# Patient Record
Sex: FEMALE | Race: WHITE | NOT HISPANIC OR LATINO | Employment: FULL TIME | URBAN - NONMETROPOLITAN AREA
[De-identification: names, ages, dates, MRNs, and addresses within clinical notes are randomized per-mention and may not be internally consistent; named-entity substitution may affect disease eponyms.]

---

## 2017-01-05 ENCOUNTER — ANESTHESIA EVENT (OUTPATIENT)
Dept: PERIOP | Facility: HOSPITAL | Age: 42
End: 2017-01-05
Payer: COMMERCIAL

## 2017-01-06 ENCOUNTER — GENERIC CONVERSION - ENCOUNTER (OUTPATIENT)
Dept: OTHER | Facility: OTHER | Age: 42
End: 2017-01-06

## 2017-01-06 ENCOUNTER — ANESTHESIA (OUTPATIENT)
Dept: PERIOP | Facility: HOSPITAL | Age: 42
End: 2017-01-06
Payer: COMMERCIAL

## 2017-01-06 ENCOUNTER — HOSPITAL ENCOUNTER (OUTPATIENT)
Facility: HOSPITAL | Age: 42
Setting detail: OUTPATIENT SURGERY
Discharge: HOME | End: 2017-01-06
Attending: INTERNAL MEDICINE | Admitting: INTERNAL MEDICINE
Payer: COMMERCIAL

## 2017-01-06 VITALS
SYSTOLIC BLOOD PRESSURE: 105 MMHG | TEMPERATURE: 99 F | WEIGHT: 135 LBS | HEIGHT: 63 IN | HEART RATE: 65 BPM | OXYGEN SATURATION: 97 % | DIASTOLIC BLOOD PRESSURE: 67 MMHG | BODY MASS INDEX: 23.92 KG/M2 | RESPIRATION RATE: 18 BRPM

## 2017-01-06 DIAGNOSIS — R10.13 EPIGASTRIC PAIN: ICD-10-CM

## 2017-01-06 LAB — EXT PREGNANCY TEST URINE: NEGATIVE

## 2017-01-06 PROCEDURE — 81025 URINE PREGNANCY TEST: CPT | Performed by: NURSE ANESTHETIST, CERTIFIED REGISTERED

## 2017-01-06 PROCEDURE — 88312 SPECIAL STAINS GROUP 1: CPT | Performed by: INTERNAL MEDICINE

## 2017-01-06 PROCEDURE — 88305 TISSUE EXAM BY PATHOLOGIST: CPT | Performed by: INTERNAL MEDICINE

## 2017-01-06 RX ORDER — MIDAZOLAM HYDROCHLORIDE 1 MG/ML
INJECTION INTRAMUSCULAR; INTRAVENOUS AS NEEDED
Status: DISCONTINUED | OUTPATIENT
Start: 2017-01-06 | End: 2017-01-06 | Stop reason: SURG

## 2017-01-06 RX ORDER — SODIUM CHLORIDE, SODIUM LACTATE, POTASSIUM CHLORIDE, CALCIUM CHLORIDE 600; 310; 30; 20 MG/100ML; MG/100ML; MG/100ML; MG/100ML
125 INJECTION, SOLUTION INTRAVENOUS CONTINUOUS
Status: DISCONTINUED | OUTPATIENT
Start: 2017-01-06 | End: 2017-01-06 | Stop reason: HOSPADM

## 2017-01-06 RX ORDER — OMEPRAZOLE 40 MG/1
40 CAPSULE, DELAYED RELEASE ORAL DAILY
COMMUNITY
End: 2019-05-18 | Stop reason: ALTCHOICE

## 2017-01-06 RX ORDER — FENTANYL CITRATE 50 UG/ML
INJECTION, SOLUTION INTRAMUSCULAR; INTRAVENOUS AS NEEDED
Status: DISCONTINUED | OUTPATIENT
Start: 2017-01-06 | End: 2017-01-06 | Stop reason: SURG

## 2017-01-06 RX ORDER — PROPOFOL 10 MG/ML
INJECTION, EMULSION INTRAVENOUS AS NEEDED
Status: DISCONTINUED | OUTPATIENT
Start: 2017-01-06 | End: 2017-01-06 | Stop reason: SURG

## 2017-01-06 RX ADMIN — FENTANYL CITRATE 50 MCG: 50 INJECTION, SOLUTION INTRAMUSCULAR; INTRAVENOUS at 08:10

## 2017-01-06 RX ADMIN — SODIUM CHLORIDE, SODIUM LACTATE, POTASSIUM CHLORIDE, AND CALCIUM CHLORIDE 125 ML/HR: .6; .31; .03; .02 INJECTION, SOLUTION INTRAVENOUS at 07:16

## 2017-01-06 RX ADMIN — PROPOFOL 20 MG: 10 INJECTION, EMULSION INTRAVENOUS at 08:12

## 2017-01-06 RX ADMIN — MIDAZOLAM HYDROCHLORIDE 2 MG: 1 INJECTION, SOLUTION INTRAMUSCULAR; INTRAVENOUS at 08:08

## 2017-01-06 RX ADMIN — FENTANYL CITRATE 50 MCG: 50 INJECTION, SOLUTION INTRAMUSCULAR; INTRAVENOUS at 08:08

## 2017-01-06 RX ADMIN — PROPOFOL 100 MG: 10 INJECTION, EMULSION INTRAVENOUS at 08:09

## 2017-01-11 ENCOUNTER — GENERIC CONVERSION - ENCOUNTER (OUTPATIENT)
Dept: OTHER | Facility: OTHER | Age: 42
End: 2017-01-11

## 2017-01-24 ENCOUNTER — GENERIC CONVERSION - ENCOUNTER (OUTPATIENT)
Dept: OTHER | Facility: OTHER | Age: 42
End: 2017-01-24

## 2017-01-24 ENCOUNTER — TRANSCRIBE ORDERS (OUTPATIENT)
Dept: ADMINISTRATIVE | Facility: HOSPITAL | Age: 42
End: 2017-01-24

## 2017-01-24 DIAGNOSIS — R10.13 EPIGASTRIC PAIN: ICD-10-CM

## 2017-01-24 DIAGNOSIS — K80.50 CALCULUS OF BILE DUCT WITHOUT CHOLANGITIS OR CHOLECYSTITIS WITHOUT OBSTRUCTION: ICD-10-CM

## 2017-01-24 DIAGNOSIS — K80.50 CALCULUS OF BILE DUCT WITHOUT MENTION OF CHOLECYSTITIS OR OBSTRUCTION: Primary | ICD-10-CM

## 2017-01-25 ENCOUNTER — HOSPITAL ENCOUNTER (OUTPATIENT)
Dept: ULTRASOUND IMAGING | Facility: HOSPITAL | Age: 42
Discharge: HOME | End: 2017-01-25
Attending: INTERNAL MEDICINE
Payer: COMMERCIAL

## 2017-01-25 DIAGNOSIS — R10.13 EPIGASTRIC PAIN: ICD-10-CM

## 2017-01-25 PROCEDURE — 76705 ECHO EXAM OF ABDOMEN: CPT

## 2017-01-26 ENCOUNTER — GENERIC CONVERSION - ENCOUNTER (OUTPATIENT)
Dept: OTHER | Facility: OTHER | Age: 42
End: 2017-01-26

## 2017-01-30 ENCOUNTER — TRANSCRIBE ORDERS (OUTPATIENT)
Dept: ADMINISTRATIVE | Facility: HOSPITAL | Age: 42
End: 2017-01-30

## 2017-01-30 ENCOUNTER — APPOINTMENT (OUTPATIENT)
Dept: LAB | Facility: HOSPITAL | Age: 42
End: 2017-01-30
Attending: INTERNAL MEDICINE
Payer: COMMERCIAL

## 2017-01-30 ENCOUNTER — GENERIC CONVERSION - ENCOUNTER (OUTPATIENT)
Dept: OTHER | Facility: OTHER | Age: 42
End: 2017-01-30

## 2017-01-30 DIAGNOSIS — R10.13 EPIGASTRIC PAIN: ICD-10-CM

## 2017-01-30 LAB
ALBUMIN SERPL BCP-MCNC: 4 G/DL (ref 3.5–5)
ALP SERPL-CCNC: 60 U/L (ref 46–116)
ALT SERPL W P-5'-P-CCNC: 32 U/L (ref 12–78)
AST SERPL W P-5'-P-CCNC: 23 U/L (ref 5–45)
BILIRUB DIRECT SERPL-MCNC: 0.24 MG/DL (ref 0–0.2)
BILIRUB SERPL-MCNC: 1.5 MG/DL (ref 0.2–1)
PROT SERPL-MCNC: 7.1 G/DL (ref 6.4–8.2)

## 2017-01-30 PROCEDURE — 36415 COLL VENOUS BLD VENIPUNCTURE: CPT

## 2017-01-30 PROCEDURE — 80076 HEPATIC FUNCTION PANEL: CPT

## 2017-02-02 ENCOUNTER — ALLSCRIPTS OFFICE VISIT (OUTPATIENT)
Dept: OTHER | Facility: OTHER | Age: 42
End: 2017-02-02

## 2017-02-02 ENCOUNTER — HOSPITAL ENCOUNTER (OUTPATIENT)
Dept: MRI IMAGING | Facility: HOSPITAL | Age: 42
Discharge: HOME | End: 2017-02-02
Attending: INTERNAL MEDICINE
Payer: COMMERCIAL

## 2017-02-02 DIAGNOSIS — K80.50 CALCULUS OF BILE DUCT WITHOUT MENTION OF CHOLECYSTITIS OR OBSTRUCTION: ICD-10-CM

## 2017-02-02 PROCEDURE — 76376 3D RENDER W/INTRP POSTPROCES: CPT

## 2017-02-02 PROCEDURE — 74181 MRI ABDOMEN W/O CONTRAST: CPT

## 2017-02-03 ENCOUNTER — ALLSCRIPTS OFFICE VISIT (OUTPATIENT)
Dept: OTHER | Facility: OTHER | Age: 42
End: 2017-02-03

## 2017-02-09 RX ORDER — MECLIZINE HYDROCHLORIDE 50 MG/1
12.5 TABLET ORAL 3 TIMES DAILY PRN
COMMUNITY
End: 2018-11-02 | Stop reason: ALTCHOICE

## 2017-02-09 RX ORDER — OXYCODONE HYDROCHLORIDE AND ACETAMINOPHEN 5; 325 MG/1; MG/1
1 TABLET ORAL EVERY 4 HOURS PRN
COMMUNITY
End: 2017-02-14 | Stop reason: HOSPADM

## 2017-02-13 ENCOUNTER — ANESTHESIA EVENT (OUTPATIENT)
Dept: PERIOP | Facility: HOSPITAL | Age: 42
End: 2017-02-13
Payer: COMMERCIAL

## 2017-02-14 ENCOUNTER — ANESTHESIA (OUTPATIENT)
Dept: PERIOP | Facility: HOSPITAL | Age: 42
End: 2017-02-14
Payer: COMMERCIAL

## 2017-02-14 ENCOUNTER — HOSPITAL ENCOUNTER (OUTPATIENT)
Facility: HOSPITAL | Age: 42
Setting detail: OUTPATIENT SURGERY
Discharge: HOME | End: 2017-02-14
Attending: SURGERY | Admitting: SURGERY
Payer: COMMERCIAL

## 2017-02-14 VITALS
TEMPERATURE: 100 F | HEART RATE: 67 BPM | SYSTOLIC BLOOD PRESSURE: 122 MMHG | RESPIRATION RATE: 18 BRPM | WEIGHT: 135 LBS | OXYGEN SATURATION: 100 % | HEIGHT: 63 IN | BODY MASS INDEX: 23.92 KG/M2 | DIASTOLIC BLOOD PRESSURE: 77 MMHG

## 2017-02-14 DIAGNOSIS — K80.50 CALCULUS OF BILE DUCT WITHOUT CHOLANGITIS OR CHOLECYSTITIS WITHOUT OBSTRUCTION: ICD-10-CM

## 2017-02-14 LAB — EXT PREGNANCY TEST URINE: NEGATIVE

## 2017-02-14 PROCEDURE — 81025 URINE PREGNANCY TEST: CPT | Performed by: NURSE ANESTHETIST, CERTIFIED REGISTERED

## 2017-02-14 PROCEDURE — 88304 TISSUE EXAM BY PATHOLOGIST: CPT | Performed by: SURGERY

## 2017-02-14 RX ORDER — BUPIVACAINE HYDROCHLORIDE 5 MG/ML
INJECTION, SOLUTION PERINEURAL AS NEEDED
Status: DISCONTINUED | OUTPATIENT
Start: 2017-02-14 | End: 2017-02-14 | Stop reason: HOSPADM

## 2017-02-14 RX ORDER — ONDANSETRON 2 MG/ML
4 INJECTION INTRAMUSCULAR; INTRAVENOUS ONCE
Status: DISCONTINUED | OUTPATIENT
Start: 2017-02-14 | End: 2017-02-14 | Stop reason: HOSPADM

## 2017-02-14 RX ORDER — HEPARIN SODIUM 5000 [USP'U]/ML
5000 INJECTION, SOLUTION INTRAVENOUS; SUBCUTANEOUS ONCE
Status: COMPLETED | OUTPATIENT
Start: 2017-02-14 | End: 2017-02-14

## 2017-02-14 RX ORDER — PROPOFOL 10 MG/ML
INJECTION, EMULSION INTRAVENOUS AS NEEDED
Status: DISCONTINUED | OUTPATIENT
Start: 2017-02-14 | End: 2017-02-14 | Stop reason: SURG

## 2017-02-14 RX ORDER — KETOROLAC TROMETHAMINE 30 MG/ML
INJECTION, SOLUTION INTRAMUSCULAR; INTRAVENOUS AS NEEDED
Status: DISCONTINUED | OUTPATIENT
Start: 2017-02-14 | End: 2017-02-14 | Stop reason: SURG

## 2017-02-14 RX ORDER — SODIUM CHLORIDE, SODIUM LACTATE, POTASSIUM CHLORIDE, CALCIUM CHLORIDE 600; 310; 30; 20 MG/100ML; MG/100ML; MG/100ML; MG/100ML
125 INJECTION, SOLUTION INTRAVENOUS CONTINUOUS
Status: DISCONTINUED | OUTPATIENT
Start: 2017-02-14 | End: 2017-02-14 | Stop reason: HOSPADM

## 2017-02-14 RX ORDER — ONDANSETRON 2 MG/ML
INJECTION INTRAMUSCULAR; INTRAVENOUS AS NEEDED
Status: DISCONTINUED | OUTPATIENT
Start: 2017-02-14 | End: 2017-02-14 | Stop reason: SURG

## 2017-02-14 RX ORDER — LIDOCAINE HYDROCHLORIDE 10 MG/ML
INJECTION, SOLUTION INFILTRATION; PERINEURAL AS NEEDED
Status: DISCONTINUED | OUTPATIENT
Start: 2017-02-14 | End: 2017-02-14 | Stop reason: SURG

## 2017-02-14 RX ORDER — ONDANSETRON 2 MG/ML
4 INJECTION INTRAMUSCULAR; INTRAVENOUS EVERY 8 HOURS PRN
Status: DISCONTINUED | OUTPATIENT
Start: 2017-02-14 | End: 2017-02-14 | Stop reason: HOSPADM

## 2017-02-14 RX ORDER — PROMETHAZINE HYDROCHLORIDE 25 MG/ML
12.5 INJECTION, SOLUTION INTRAMUSCULAR; INTRAVENOUS ONCE AS NEEDED
Status: DISCONTINUED | OUTPATIENT
Start: 2017-02-14 | End: 2017-02-14 | Stop reason: HOSPADM

## 2017-02-14 RX ORDER — OXYCODONE HYDROCHLORIDE AND ACETAMINOPHEN 5; 325 MG/1; MG/1
1-2 TABLET ORAL EVERY 4 HOURS PRN
Qty: 40 TABLET | Refills: 0 | Status: SHIPPED | OUTPATIENT
Start: 2017-02-14 | End: 2018-04-30 | Stop reason: ALTCHOICE

## 2017-02-14 RX ORDER — MIDAZOLAM HYDROCHLORIDE 1 MG/ML
INJECTION INTRAMUSCULAR; INTRAVENOUS AS NEEDED
Status: DISCONTINUED | OUTPATIENT
Start: 2017-02-14 | End: 2017-02-14 | Stop reason: SURG

## 2017-02-14 RX ORDER — GLYCOPYRROLATE 0.2 MG/ML
INJECTION INTRAMUSCULAR; INTRAVENOUS AS NEEDED
Status: DISCONTINUED | OUTPATIENT
Start: 2017-02-14 | End: 2017-02-14 | Stop reason: SURG

## 2017-02-14 RX ORDER — FENTANYL CITRATE 50 UG/ML
INJECTION, SOLUTION INTRAMUSCULAR; INTRAVENOUS AS NEEDED
Status: DISCONTINUED | OUTPATIENT
Start: 2017-02-14 | End: 2017-02-14 | Stop reason: SURG

## 2017-02-14 RX ORDER — FENTANYL CITRATE/PF 50 MCG/ML
25 SYRINGE (ML) INJECTION
Status: DISCONTINUED | OUTPATIENT
Start: 2017-02-14 | End: 2017-02-14 | Stop reason: HOSPADM

## 2017-02-14 RX ORDER — SCOLOPAMINE TRANSDERMAL SYSTEM 1 MG/1
PATCH, EXTENDED RELEASE TRANSDERMAL AS NEEDED
Status: DISCONTINUED | OUTPATIENT
Start: 2017-02-14 | End: 2017-02-14 | Stop reason: SURG

## 2017-02-14 RX ORDER — METOCLOPRAMIDE HYDROCHLORIDE 5 MG/ML
INJECTION INTRAMUSCULAR; INTRAVENOUS AS NEEDED
Status: DISCONTINUED | OUTPATIENT
Start: 2017-02-14 | End: 2017-02-14 | Stop reason: SURG

## 2017-02-14 RX ORDER — ROCURONIUM BROMIDE 10 MG/ML
INJECTION, SOLUTION INTRAVENOUS AS NEEDED
Status: DISCONTINUED | OUTPATIENT
Start: 2017-02-14 | End: 2017-02-14 | Stop reason: SURG

## 2017-02-14 RX ORDER — SUCCINYLCHOLINE CHLORIDE 20 MG/ML
INJECTION INTRAMUSCULAR; INTRAVENOUS AS NEEDED
Status: DISCONTINUED | OUTPATIENT
Start: 2017-02-14 | End: 2017-02-14 | Stop reason: SURG

## 2017-02-14 RX ORDER — MORPHINE SULFATE 2 MG/ML
2 INJECTION, SOLUTION INTRAMUSCULAR; INTRAVENOUS EVERY 4 HOURS PRN
Status: DISCONTINUED | OUTPATIENT
Start: 2017-02-14 | End: 2017-02-14 | Stop reason: HOSPADM

## 2017-02-14 RX ORDER — OXYCODONE HYDROCHLORIDE AND ACETAMINOPHEN 5; 325 MG/1; MG/1
2 TABLET ORAL EVERY 4 HOURS PRN
Status: DISCONTINUED | OUTPATIENT
Start: 2017-02-14 | End: 2017-02-14 | Stop reason: HOSPADM

## 2017-02-14 RX ADMIN — KETOROLAC TROMETHAMINE 30 MG: 30 INJECTION, SOLUTION INTRAMUSCULAR at 08:49

## 2017-02-14 RX ADMIN — OXYCODONE HYDROCHLORIDE AND ACETAMINOPHEN 2 TABLET: 5; 325 TABLET ORAL at 10:23

## 2017-02-14 RX ADMIN — METOCLOPRAMIDE HYDROCHLORIDE 10 MG: 5 INJECTION INTRAMUSCULAR; INTRAVENOUS at 07:40

## 2017-02-14 RX ADMIN — DEXAMETHASONE SODIUM PHOSPHATE 8 MG: 10 INJECTION INTRAMUSCULAR; INTRAVENOUS at 07:40

## 2017-02-14 RX ADMIN — HEPARIN SODIUM 5000 UNITS: 5000 INJECTION, SOLUTION INTRAVENOUS; SUBCUTANEOUS at 06:52

## 2017-02-14 RX ADMIN — SUCCINYLCHOLINE CHLORIDE 100 MG: 20 INJECTION, SOLUTION INTRAMUSCULAR; INTRAVENOUS at 07:33

## 2017-02-14 RX ADMIN — CEFAZOLIN SODIUM 1000 MG: 1 SOLUTION INTRAVENOUS at 07:25

## 2017-02-14 RX ADMIN — ROCURONIUM BROMIDE 18 MG: 10 INJECTION, SOLUTION INTRAVENOUS at 07:43

## 2017-02-14 RX ADMIN — ROCURONIUM BROMIDE 2 MG: 10 INJECTION, SOLUTION INTRAVENOUS at 07:33

## 2017-02-14 RX ADMIN — NEOSTIGMINE METHYLSULFATE 2 MG: 1 INJECTION INTRAMUSCULAR; INTRAVENOUS; SUBCUTANEOUS at 08:52

## 2017-02-14 RX ADMIN — FENTANYL CITRATE 50 MCG: 50 INJECTION, SOLUTION INTRAMUSCULAR; INTRAVENOUS at 07:43

## 2017-02-14 RX ADMIN — SCOPALAMINE 1 PATCH: 1 PATCH, EXTENDED RELEASE TRANSDERMAL at 07:15

## 2017-02-14 RX ADMIN — PROPOFOL 150 MG: 10 INJECTION, EMULSION INTRAVENOUS at 07:33

## 2017-02-14 RX ADMIN — MIDAZOLAM HYDROCHLORIDE 2 MG: 1 INJECTION, SOLUTION INTRAMUSCULAR; INTRAVENOUS at 07:29

## 2017-02-14 RX ADMIN — SODIUM CHLORIDE, SODIUM LACTATE, POTASSIUM CHLORIDE, AND CALCIUM CHLORIDE: .6; .31; .03; .02 INJECTION, SOLUTION INTRAVENOUS at 08:49

## 2017-02-14 RX ADMIN — FENTANYL CITRATE 50 MCG: 50 INJECTION, SOLUTION INTRAMUSCULAR; INTRAVENOUS at 08:24

## 2017-02-14 RX ADMIN — ONDANSETRON HYDROCHLORIDE 4 MG: 2 INJECTION, SOLUTION INTRAVENOUS at 07:29

## 2017-02-14 RX ADMIN — LIDOCAINE HYDROCHLORIDE 30 MG: 10 INJECTION, SOLUTION INFILTRATION; PERINEURAL at 07:33

## 2017-02-14 RX ADMIN — SODIUM CHLORIDE, SODIUM LACTATE, POTASSIUM CHLORIDE, AND CALCIUM CHLORIDE 125 ML/HR: .6; .31; .03; .02 INJECTION, SOLUTION INTRAVENOUS at 06:52

## 2017-02-14 RX ADMIN — GLYCOPYRROLATE 0.4 MG: 0.2 INJECTION, SOLUTION INTRAMUSCULAR; INTRAVENOUS at 08:52

## 2017-02-17 ENCOUNTER — GENERIC CONVERSION - ENCOUNTER (OUTPATIENT)
Dept: OTHER | Facility: OTHER | Age: 42
End: 2017-02-17

## 2017-02-27 ENCOUNTER — ALLSCRIPTS OFFICE VISIT (OUTPATIENT)
Dept: OTHER | Facility: OTHER | Age: 42
End: 2017-02-27

## 2017-04-03 DIAGNOSIS — Z00.00 ENCOUNTER FOR GENERAL ADULT MEDICAL EXAMINATION WITHOUT ABNORMAL FINDINGS: ICD-10-CM

## 2017-04-03 DIAGNOSIS — E78.5 HYPERLIPIDEMIA: ICD-10-CM

## 2017-04-08 ENCOUNTER — TRANSCRIBE ORDERS (OUTPATIENT)
Dept: ADMINISTRATIVE | Facility: HOSPITAL | Age: 42
End: 2017-04-08

## 2017-04-08 ENCOUNTER — APPOINTMENT (OUTPATIENT)
Dept: LAB | Facility: HOSPITAL | Age: 42
End: 2017-04-08
Attending: INTERNAL MEDICINE
Payer: COMMERCIAL

## 2017-04-08 DIAGNOSIS — Z00.00 ENCOUNTER FOR GENERAL ADULT MEDICAL EXAMINATION WITHOUT ABNORMAL FINDINGS: ICD-10-CM

## 2017-04-08 DIAGNOSIS — E78.5 HYPERLIPIDEMIA: ICD-10-CM

## 2017-04-08 LAB
25(OH)D3 SERPL-MCNC: 20.4 NG/ML (ref 30–100)
ALBUMIN SERPL BCP-MCNC: 4 G/DL (ref 3.5–5)
ALP SERPL-CCNC: 76 U/L (ref 46–116)
ALT SERPL W P-5'-P-CCNC: 15 U/L (ref 12–78)
ANION GAP SERPL CALCULATED.3IONS-SCNC: 6 MMOL/L (ref 4–13)
AST SERPL W P-5'-P-CCNC: 9 U/L (ref 5–45)
BILIRUB SERPL-MCNC: 0.8 MG/DL (ref 0.2–1)
BUN SERPL-MCNC: 14 MG/DL (ref 5–25)
CALCIUM SERPL-MCNC: 9.2 MG/DL (ref 8.3–10.1)
CHLORIDE SERPL-SCNC: 106 MMOL/L (ref 100–108)
CHOLEST SERPL-MCNC: 195 MG/DL (ref 50–200)
CO2 SERPL-SCNC: 30 MMOL/L (ref 21–32)
CREAT SERPL-MCNC: 0.79 MG/DL (ref 0.6–1.3)
GFR SERPL CREATININE-BSD FRML MDRD: >60 ML/MIN/1.73SQ M
GLUCOSE P FAST SERPL-MCNC: 78 MG/DL (ref 65–99)
HDLC SERPL-MCNC: 54 MG/DL (ref 40–60)
LDLC SERPL CALC-MCNC: 126 MG/DL (ref 0–100)
POTASSIUM SERPL-SCNC: 4.7 MMOL/L (ref 3.5–5.3)
PROT SERPL-MCNC: 7 G/DL (ref 6.4–8.2)
SODIUM SERPL-SCNC: 142 MMOL/L (ref 136–145)
TRIGL SERPL-MCNC: 73 MG/DL

## 2017-04-08 PROCEDURE — 36415 COLL VENOUS BLD VENIPUNCTURE: CPT

## 2017-04-08 PROCEDURE — 80061 LIPID PANEL: CPT

## 2017-04-08 PROCEDURE — 80053 COMPREHEN METABOLIC PANEL: CPT

## 2017-04-08 PROCEDURE — 82306 VITAMIN D 25 HYDROXY: CPT

## 2017-04-10 ENCOUNTER — ALLSCRIPTS OFFICE VISIT (OUTPATIENT)
Dept: OTHER | Facility: OTHER | Age: 42
End: 2017-04-10

## 2017-11-14 ENCOUNTER — TRANSCRIBE ORDERS (OUTPATIENT)
Dept: ADMINISTRATIVE | Facility: HOSPITAL | Age: 42
End: 2017-11-14

## 2017-11-14 DIAGNOSIS — Z12.39 SCREENING BREAST EXAMINATION: Primary | ICD-10-CM

## 2017-11-17 ENCOUNTER — APPOINTMENT (OUTPATIENT)
Dept: LAB | Facility: HOSPITAL | Age: 42
End: 2017-11-17
Payer: COMMERCIAL

## 2017-11-17 ENCOUNTER — TRANSCRIBE ORDERS (OUTPATIENT)
Dept: ADMINISTRATIVE | Facility: HOSPITAL | Age: 42
End: 2017-11-17

## 2017-11-17 DIAGNOSIS — N92.1 METRORRHAGIA: ICD-10-CM

## 2017-11-17 DIAGNOSIS — N92.1 METRORRHAGIA: Primary | ICD-10-CM

## 2017-11-17 LAB — FSH SERPL-ACNC: 9.2 MIU/ML

## 2017-11-17 PROCEDURE — 36415 COLL VENOUS BLD VENIPUNCTURE: CPT

## 2017-11-17 PROCEDURE — 83001 ASSAY OF GONADOTROPIN (FSH): CPT

## 2017-12-07 ENCOUNTER — HOSPITAL ENCOUNTER (OUTPATIENT)
Dept: MAMMOGRAPHY | Facility: HOSPITAL | Age: 42
Discharge: HOME | End: 2017-12-07
Payer: COMMERCIAL

## 2017-12-07 ENCOUNTER — GENERIC CONVERSION - ENCOUNTER (OUTPATIENT)
Dept: OTHER | Facility: OTHER | Age: 42
End: 2017-12-07

## 2017-12-07 DIAGNOSIS — Z12.39 SCREENING BREAST EXAMINATION: ICD-10-CM

## 2017-12-07 PROCEDURE — G0202 SCR MAMMO BI INCL CAD: HCPCS

## 2018-01-10 NOTE — RESULT NOTES
Verified Results  (1) TISSUE EXAM [id number] 08:13AM Selma Slater     Test Name Result Flag Reference   LAB AP CASE REPORT (Report)     Surgical Pathology Report             Case: [id number]                   Authorizing Provider: Martin Yoo MD      Collected:      01/06/2017 0813        Ordering Location:   [facility] Received:      01/06/2017 1141                    Operating Room                                 Pathologist:      Saskia Love MD                                Specimens:  A) - Duodenum, Cold forcep Bx, R/O Celiac                               B) - Stomach, Cold forcep Bx, Antrum, Gastritis, R/O H Pylori                     C) - Esophagus, Cold forcep Bx, Proximal esophageal nodule   LAB AP FINAL DIAGNOSIS (Report)     A  Duodenum, Cold forceps Biopsy, R/O Celiac:  - Benign duodenal mucosa with mildly increased lamina propria plasma cells   and    Brunner's gland hyperplasia  - No villous atrophy, no intraepithelial lymphocytosis or crypt   hyperplasia to suggest malabsorptive enteropathy   - No active duodenitis  -- No granulomas or organism    - No glandular dysplasia and no evidence of malignancy  B  Stomach, Cold forceps Biopsy, Antrum, Gastritis, R/O H Pylori:  - Benign antral gastric mucosa with mild chronic inactive gastritis,   foveolar and smooth muscle    hyperplasia and vascular congestion consistent with reactive   gastropathy   - Negative for curvilinear Helicobacter organisms, confirmed by special   stain Warthin-Starry    performed with appropriate controls  - No glandular atrophy or intestinal metaplasia identified   - No glandular dysplasia and no evidence of malignancy  C  Esophagus, Cold forceps Biopsy, Proximal esophageal nodule:  - Benign squamous papilloma  - No active esophagitis  - Negative for malignancy  Interpretation performed at [facility], [address] [place] [address]    Electronically signed by Saskia Love MD on 1/10/2017 at 11:46 AM   LAB AP SURGICAL ADDITIONAL INFORMATION (Report)     These tests were developed and their performance characteristics   determined by [facility] or [facility]  They may not be cleared or approved by the [place]  Food and   Drug Administration  The FDA has determined that such clearance or   approval is not necessary  These tests are used for clinical purposes  They should not be regarded as investigational or for research  This   laboratory has been approved by [place] [address], designated as a high-complexity   laboratory and is qualified to perform these tests  LAB AP GROSS DESCRIPTION (Report)     A  The specimen is received in formalin, labeled with the patient's name   and hospital number, and is designated duodenum biopsy rule out celiac  The specimen consists of multiple tan soft tissue fragments measuring in   aggregate 0 4 x 0 4 x 0 1 cm  Entirely submitted  One cassette  B  The specimen is received in formalin, labeled with the patient's name   and hospital number, and is designated biopsy antrum gastritis rule out   H  pylori  The specimen consists of 2 tan soft tissue fragments measuring   0 3 and 0 4 cm  Entirely submitted  One cassette  C  The specimen is received in formalin, labeled with the patient's name   and hospital number, and is designated biopsy proximal esophageal   nodule  The specimen consists of 3 white tan soft tissue fragments each   measuring 0 2-0 4 cm  Entirely submitted  One cassette  Note: The estimated total formalin fixation time based upon information   provided by the submitting clinician and the standard processing schedule   is 20 25 hours  [facility]   LAB AP CLINICAL INFORMATION      Nodule found in the proximal third of the esophagus  Polypectomy was performed  Mild non-erosive gastritis found in the antrum  Normal duodenal bulb, 2nd portion of the duodenum, and major papilla  Four   biopsies taken

## 2018-01-12 VITALS
BODY MASS INDEX: 22.73 KG/M2 | DIASTOLIC BLOOD PRESSURE: 68 MMHG | HEIGHT: 64 IN | WEIGHT: 133.13 LBS | SYSTOLIC BLOOD PRESSURE: 110 MMHG | TEMPERATURE: 97.5 F | HEART RATE: 88 BPM

## 2018-01-12 NOTE — RESULT NOTES
Verified Results  (1) TISSUE EXAM [id number] 08:49AM Clifford Edwards     Test Name Result Flag Reference   LAB AP CASE REPORT (Report)     Surgical Pathology Report             Case: [id number]                   Authorizing Provider: Penelope Franklin DO     Collected:      02/14/2017 9298        Ordering Location:   [facility] Received:      02/14/2017 1043                    Operating Room                                 Pathologist:      Maureen Sr MD                               Specimen:  Gallbladder, Gallbladder and stones   LAB AP FINAL DIAGNOSIS      Gallbladder, cholecystectomy:   - Cholelithiasis   - Chronic cholecystitis  Electronically signed by Maureen Sr MD on 2/16/2017 at 10:06 AM   LAB AP SURGICAL ADDITIONAL INFORMATION (Report)     These tests were developed and their performance characteristics   determined by [facility] or [facility]  They may not be cleared or approved by the [place]  Food and   Drug Administration  The FDA has determined that such clearance or   approval is not necessary  These tests are used for clinical purposes  They should not be regarded as investigational or for research  This   laboratory has been approved by [facility], designated as a high-complexity   laboratory and is qualified to perform these tests  Interpretation performed at , [address]   LAB AP GROSS DESCRIPTION (Report)     A  The specimen is received in formalin, labeled with the patient's name   and hospital number, and is designated gallbladder and stones  The   specimen consists of a gallbladder measuring 10 8 x 3 0 x 2 6 and   measures  The serosa is tan-pink to purple, smooth and glistening  The   proximal cystic duct margin is inked blue  The specimen is opened, and the   lumen contains liquid bile, and multiple tan-yellow stony fragments   measuring in aggregate 2 9 x 2 8 x 0 5 cm   The wall measures 0 2 cm in   thickness  The mucosa is green and velvety  Representative sections  One   cassette  Note: The estimated total formalin fixation time based upon information   provided by the submitting clinician and the standard processing schedule   is 25 75 hours    [facility]

## 2018-01-12 NOTE — MISCELLANEOUS
Message  Return to work or school:    She is able to return to work on  01/25/2017  Please excuse Darci Lopez from work on 1/24/17  [facility]        Signatures   Electronically signed by : Hansel Madden, ; Jan 24 2017  3:41PM EST                       (Author)

## 2018-01-13 NOTE — MISCELLANEOUS
Message  Return to work or school:    She is able to return to work on  2/20/2017    She can perform work without limitations  Patient will need to be off work due to surgery from 2/14/2017 through 2/17/2017  She may return to her work duties from home starting on 2/20/2017  She may return to work in the office setting on 3/6/2017  Marissa Melendez MPH        Signatures   Electronically signed by : Lo Carrizales DO; Feb 17 2017  9:30AM EST                       (Author)

## 2018-01-14 VITALS
BODY MASS INDEX: 23.09 KG/M2 | DIASTOLIC BLOOD PRESSURE: 69 MMHG | SYSTOLIC BLOOD PRESSURE: 145 MMHG | HEART RATE: 86 BPM | TEMPERATURE: 97.5 F | HEIGHT: 64 IN | WEIGHT: 135.25 LBS

## 2018-01-14 VITALS
OXYGEN SATURATION: 98 % | TEMPERATURE: 98.8 F | WEIGHT: 134.38 LBS | HEART RATE: 80 BPM | SYSTOLIC BLOOD PRESSURE: 110 MMHG | BODY MASS INDEX: 22.94 KG/M2 | DIASTOLIC BLOOD PRESSURE: 80 MMHG | HEIGHT: 64 IN

## 2018-01-14 NOTE — RESULT NOTES
Verified Results  Gilbert BECKHAM  23  [id number] 06:25AM Debra Landers Order Number: [id number]   Performing Comments: r/o cholelithiasis   - Patient Instructions: To schedule this appointment, please contact Central Scheduling at [phone]  Test Name Result Flag Reference   US GALLBLADDER (Report)     RIGHT UPPER QUADRANT ULTRASOUND     INDICATION: Right upper quadrant pain  COMPARISON: CT 7/7/2016     TECHNIQUE:  Real-time ultrasound of the right upper quadrant was performed with a curvilinear transducer with both volumetric sweeps and still imaging techniques  FINDINGS:     PANCREAS: Visualized portions of the pancreas are within normal limits  AORTA AND IVC: Visualized portions are normal for patient age  LIVER:   Size: Within normal range  The liver measures 14 6 cm in the midclavicular line  Contour: Surface contour is smooth  Parenchyma: Echogenicity and echotexture are within normal limits  No evidence of suspicious mass  Limited imaging of the main portal vein shows it to be patent and hepatopedal       BILIARY:   The gallbladder is normal in caliber  No wall thickening or pericholecystic fluid  Multiple mobile dependent calculi  No sludge  No sonographic Mayer's sign  No intrahepatic biliary dilatation  CBD measures 3 8 mm  There is suggestion of a 3 mm calculus within the common bile duct  KIDNEY:    Right kidney measures 10 4 x 3 7 cm  Within normal limits  ASCITES:  None  IMPRESSION:       1  Cholelithiasis without sonographic evidence of acute cholecystitis  2  Suggestion of a 3 mm calculus within the common bile duct  No common bile duct or intrahepatic ductal dilatation  Further evaluation with ERCP or MRCP recommended  ##sigslh##sigslh       Workstation performed: [id number]     Signed by:   Maikel Meehan MD   1/26/17       Plan  Choledocholithiasis    · * MRI ABDOMEN WO CONTRAST AND MRCP; Status:Active;  Requested FGZ:03RXR1532;   Cholelithiasis    · 1 - Angelica Tomlin DO General Surgery Physician Referral  Consult Only: the  expectation is that the referring provider will communicate back to the patient on  treatment options  Evaluation and Treatment: the expectation is that the referred to  provider will communicate back to the patient on treatment options    Status: Active   Requested for: 22TKX2705  Care Summary provided  : Yes

## 2018-01-15 VITALS
TEMPERATURE: 97.5 F | SYSTOLIC BLOOD PRESSURE: 124 MMHG | OXYGEN SATURATION: 99 % | DIASTOLIC BLOOD PRESSURE: 72 MMHG | HEART RATE: 72 BPM | HEIGHT: 64 IN | BODY MASS INDEX: 22.07 KG/M2 | WEIGHT: 129.25 LBS

## 2018-01-17 NOTE — MISCELLANEOUS
Message  Return to work or school:    She is able to return to work on  October 20,2016      Please excuse from work duties Wednesday October 19, 2016          Signatures   Electronically signed by : Isac Moffett DO; Oct 19 2016 11:09AM EST                       (Author)

## 2018-01-18 NOTE — RESULT NOTES
Verified Results  (1) HEPATIC FUNCTION PANEL 30Jan2017 06:47AM Laura Carreno   TW Order Number: [id number]     Test Name Result Flag Reference   ALBUMIN 4 0 g/dL  3 5-5 0   - Patient Instructions: This is a non fasting blood test  Please drink two glasses of water the morning of test     - Patient Instructions:  This is a non fasting blood test  Please drink two glasses of water the morning of test    ALK PHOSPHATAS 60 U/L     ALT (SGPT) 32 U/L  12-78   AST(SGOT) 23 U/L  5-45   BILI, DIRECT 0 24 mg/dL H 0 00-0 20   BILI, TOTAL 1 50 mg/dL H 0 20-1 00   TOTAL PROTEIN 7 1 g/dL  6 4-8 2

## 2018-01-18 NOTE — MISCELLANEOUS
Dear Rahul Han,  [address] [facility] office has attempted to contact you regarding results  Please give our office a call back at [phone]    Thank you,  [facility]      Electronically signed by:Radha Adan  2:35PM EST      Electronically signed by:Thomas Casillas MD  Jan 30 2017  3:12PM EST

## 2018-01-24 NOTE — PROGRESS NOTES
Assessment   1  Encounter for preventive health examination (V70 0) (Z00 00)  2  Former smoker (V15 82) (J07 080)    Plan  Allergic rhinitis, Hyperlipidemia    · Follow-up visit in 1 year Evaluation and Treatment  Follow-up  Status: Hold For -  Scheduling  Requested for: 18Tcn9181  Epigastric abdominal pain    · Stop: Omeprazole 40 MG Oral Capsule Delayed Release   · Stop: Oxycodone-Acetaminophen 5-325 MG Oral Tablet (Percocet)  Health Maintenance    · *VB-Depression Screening; Status:Complete - Retrospective By Protocol Authorization;    Done: 42NQD7761 12:00AM    Discussion/Summary  health maintenance visit healthy adult female Currently, she  eats a healthy diet1  and  has an adequate exercise regimen1  1   cervical cancer screening is current1  Colorectal cancer screening:  colorectal cancer screening is current1  1   Osteoporosis screening:  bone mineral density testing is not indicated1  1   The  patient declines immunizations1  1   Advice and education were given regarding  aerobic exercise1  and  sunscreen use1  1   Patient discussion:  discussed with the patient1  1   Continue present exercise,diet regimen  Vit d encouraged daily 1000 iu daily  Labs reviewed  Rto 1 year/prn1        Possible side effects of new medications were reviewed with the patient/guardian today1  The treatment plan was reviewed with the patient/guardian  The patient/guardian understands and agrees with the treatment plan         Self Referrals: No       1 Amended By: Nba Rosales; Apr 10 2017 10:10 PM EST    Chief Complaint  Pt presents today for a wellness visit  No new medications or need for refills  No new allergies  No additional comments  History of Present Illness  HM, Adult Female: The patient is being seen for a  health maintenance1  evaluation1   The last health maintenance visit was1  1 yr year(s) ago1   General Health: The patient's health since the last visit is described as1  good1    She has regular dental visits1   She denies vision problems1   She denies hearing loss1   Immunizations status:1  not up to date1   Lifestyle:1   She consumes a diverse and healthy diet1   She does not have any weight concerns1   She exercises regularly1   She does not use tobacco1   She denies alcohol use1   She denies drug use1   Reproductive health:1   She reports normal menses1   She uses no contraception1   She is sexually [place]   She denies prior pregnancies1   Screening: Cancer screening reviewed and current1   Metabolic screening reviewed and current1   Risk screening reviewed and current1   HPI: Pt doing well  Had gallbladder removed in Feb, feeling better  No acute issues  Exercises regularly and healthy diet  No allergy/sinus sxs  No heartburn,dc ppi1        1 Amended By: Gretchen Smith; Apr 10 2017 10:05 PM EST    Review of Systems    Constitutional:1  No fever, no chills, feels well, no tiredness, no recent weight gain or weight loss1   Eyes:1  No complaints of eye pain, no red eyes, no eyesight problems, no discharge, no dry eyes, no itching of eyes1   ENT:1  no complaints of earache, no loss of hearing, no nose bleeds, no nasal discharge, no sore throat, no hoarseness1   Cardiovascular:1  No complaints of slow heart rate, no fast heart rate, no chest pain, no palpitations, no leg claudication, no lower extremity edema1   Respiratory:1  No complaints of shortness of breath, no wheezing, no cough, no SOB on exertion, no orthopnea, no PND1   Gastrointestinal:1  No complaints of abdominal pain, no constipation, no nausea or vomiting, no diarrhea, no bloody stools1   Genitourinary:1  No complaints of dysuria, no incontinence, no pelvic pain, no dysmenorrhea, no vaginal discharge or bleeding1   Musculoskeletal:1  No complaints of arthralgias, no myalgias, no joint swelling or stiffness, no limb pain or swelling1      Integumentary:1  No complaints of skin rash or lesions, no itching, no skin wounds, no breast pain or lump1   Neurological:1  No complaints of headache, no confusion, no convulsions, no numbness, no dizziness or fainting, no tingling, no limb weakness, no difficulty walking1   Psychiatric:1  Not suicidal, no sleep disturbance, no anxiety or depression, no change in personality, no emotional problems1   Endocrine:1  No complaints of proptosis, no hot flashes, no muscle weakness, no deepening of the voice, no feelings of weakness1   Hematologic/Lymphatic:1  No complaints of swollen glands, no swollen glands in the neck, does not bleed easily, does not bruise easily1   Over the past 2 weeks, how often have you been bothered by the following problems? 1 ) Little interest or pleasure in doing things? 1  Not at all1     2 ) Feeling down, depressed or hopeless? 1  Not at all1     3 ) Trouble falling asleep or sleeping too much? 1  Not at all1     4 ) Feeling tired or having little energy? 1  Not at all1     5 ) Poor appetite or overeating? 1  Not at all1     6 ) Feeling bad about yourself, or that you are a failure, or have let yourself or your family down? 1  Not at all1     7 ) Trouble concentrating on things, such as reading a newspaper or watching television? 1  Not at all1     8 ) Moving or speaking so slowly that other people could have noticed, or the opposite, moving or speaking faster than usual?1  Not at all1   How difficult have these problems made it for you to do your work, take care of things at home, or get along with people? 1  Not at all1   Score 1        1 Amended By: Hardeep Barton; Apr 10 2017 10:05 PM EST    Active Problems   1  Acute epidemic vertigo (078 81) (A88 1)  2  Allergic rhinitis (477 9) (J30 9)  3  Borderline hyperlipidemia (272 4) (E78 5)  4  Cholelithiasis (574 20) (K80 20)  5  Encounter for routine laboratory testing (V72 60) (Z00 00)  6  Epigastric abdominal pain (789 06) (R10 13)  7  Hordeolum externum of right eye (373 11) (H00 013)  8  Hyperlipidemia (272 4) (E78 5)  9  Mild intermittent asthma without complication (551 20) (T79 79)  10  Nephrolithiasis (592 0) (N20 0)  11  Periorbital cellulitis of right eye (682 0) (L03 213)  12  Periorbital edema (782 3) (R60 0)  13  Postoperative visit (V58 49) (Z48 89)  14  Recurrent biliary colic (689 81) (A53 33)    Past Medical History    · History of Choledocholithiasis (574 50) (K80 50)   · History of Need for influenza vaccination (V04 81) (Z23)   · History of Otalgia of left ear (388 70) (H92 02)   · History of Sinusitis (473 9) (J32 9)   · History of Vaginal Pap smear (V76 47) (Z12 72)   · History of Visit for screening mammogram (V76 12) (Z12 31)    Surgical History    · History of Rotator Cuff Repair    Family History  Mother    · Family history of Hypertension (401 9) (I10)  Father    · Family history of Lymphoma (202 80) (C85 90)  Paternal Grandfather    · Family history of Stroke of unknown cause  Family History    · Denied: Family history of Drug abuse    Social History    · Always uses seat belt   · Caffeine Use   · Dental care, regularly   · Denied: Drug use (305 90) (F19 90)   · Former smoker (V15 82) (Z87 891)   · Full-time employment   · Good dental hygiene   ·    · Occasional alcohol use   · Uses Safety Equipment - Seatbelts    Current Meds  1  Meclizine HCl - 12 5 MG Oral Tablet; TAKE 1 Q 8HOURS; Therapy: 16OYR0118 to (Mynor Ceron)  Requested for: 95LQR6886; Last   UP:07VLH2519 Ordered  2  Omeprazole 40 MG Oral Capsule Delayed Release; take 1 capsule daily; Therapy: 63Rtt5382 to (Evaluate:28Apr2017)  Requested for: 67Txj0518; Last   Rx:71Lne8540 Ordered  3  Oxycodone-Acetaminophen 5-325 MG Oral Tablet; TAKE 1 TABLET EVERY 4 HOURS   AS NEEDED FOR PAIN;   Therapy: 70ZXG5335 to (Evaluate:20Qlr9331); Last Rx:26Jan2017 Ordered    Allergies   1   No Known Drug Allergies    Vitals   Recorded: 07Fvm0305 05:59PM Recorded: 14Ugc1708 05:49PM   Temperature  97 5 F   Heart Rate  72 Systolic 176    Diastolic 72    Height  5 ft 3 5 in   Weight  129 lb 4 00 oz   BMI Calculated  22 54   BSA Calculated  1 61   O2 Saturation  99   Pain Scale  0     Physical Exam    Constitutional1    General appearance: No acute distress, well appearing and well nourished  1  Looks well1   Head and Face1    Head and face: Normal 1    Palpation of the face and sinuses: No sinus tenderness  1    Eyes1    Conjunctiva and lids: No swelling, erythema or discharge  1    Pupils and irises: Equal, round, reactive to light  1    Ophthalmoscopic examination: Normal fundi and optic discs  1    Ears, Nose, Mouth, and Throat1    External inspection of ears and nose: Normal 1    Otoscopic examination: Tympanic membranes translucent with normal light reflex  Canals patent without erythema  1    Hearing: Normal 1    Nasal mucosa, septum, and turbinates: Normal without edema or erythema  1    Lips, teeth, and gums: Normal, good dentition  1    Oropharynx: Normal with no erythema, edema, exudate or lesions  1    Neck1    Neck: Supple, symmetric, trachea midline, no masses  1    Thyroid: Normal, no thyromegaly  1    Pulmonary1    Respiratory effort: No increased work of breathing or signs of respiratory distress  1    Percussion of chest: Normal 1    Palpation of chest: Normal 1    Auscultation of lungs: Clear to auscultation  1    Cardiovascular1    Palpation of heart: Normal PMI, no thrills  1    Auscultation of heart: Normal rate and rhythm, normal S1 and S2, no murmurs  1    Carotid pulses: 2+ bilaterally  1    Abdominal aorta: Normal 1    Femoral pulses: 2+ bilaterally  1    Pedal pulses: 2+ bilaterally  1    Peripheral vascular exam: Normal 1    Examination of extremities for edema and/or varicosities: Normal 1    Abdomen1    Abdomen: Non-tender, no masses  1    Liver and spleen: No hepatomegaly or splenomegaly  1    Examination for hernias: No hernia appreciated  1    Lymphatic1    Palpation of lymph nodes in neck: No lymphadenopathy  1 Palpation of lymph nodes in axillae: No lymphadenopathy  1    Palpation of lymph nodes in groin: No lymphadenopathy  1    Palpation of lymph nodes in other areas: No lymphadenopathy  1    Musculoskeletal1    Gait and station: Normal 1    Digits and nails: Normal without clubbing or cyanosis  1    Joints, bones, and muscles: Normal 1    Range of motion: Normal 1    Stability: Normal 1    Muscle strength/tone: Normal 1    Skin1    Skin and subcutaneous tissue: Normal without rashes or lesions  1    Palpation of skin and subcutaneous tissue: Normal turgor  1    Neurologic1    Cranial nerves: Cranial nerves II-XII intact  1    Cortical function: Normal mental status  1    Reflexes: 2+ and symmetric  1    Sensation: No sensory loss  1    Coordination: Normal finger to nose and heel to shin  1    Psychiatric1    Judgment and insight: Normal 1    Orientation to person, place, and time: Normal 1    Recent and remote memory: Intact  1    Mood and affect: Normal 1        1 Amended By: Efrain Agustin; Apr 10 2017 10:06 PM EST    Health Management  History of Visit for screening mammogram   Digital Bilateral Screening Mammogram With CAD; every 1 year; Last 42PMQ9294; Next  Due: 52EEQ0286; Overdue    Signatures   Electronically signed by : Brandin Krueger DO;  Apr 10 2017 10:10PM EST                       (Author)

## 2018-02-27 ENCOUNTER — TELEPHONE (OUTPATIENT)
Dept: INTERNAL MEDICINE CLINIC | Facility: CLINIC | Age: 43
End: 2018-02-27

## 2018-02-27 DIAGNOSIS — E55.9 VITAMIN D DEFICIENCY: ICD-10-CM

## 2018-02-27 DIAGNOSIS — E78.5 HYPERLIPIDEMIA, UNSPECIFIED HYPERLIPIDEMIA TYPE: Primary | ICD-10-CM

## 2018-04-21 ENCOUNTER — APPOINTMENT (OUTPATIENT)
Dept: LAB | Facility: HOSPITAL | Age: 43
End: 2018-04-21
Attending: INTERNAL MEDICINE
Payer: COMMERCIAL

## 2018-04-21 DIAGNOSIS — E55.9 VITAMIN D DEFICIENCY: ICD-10-CM

## 2018-04-21 DIAGNOSIS — E78.5 HYPERLIPIDEMIA, UNSPECIFIED HYPERLIPIDEMIA TYPE: ICD-10-CM

## 2018-04-21 LAB
25(OH)D3 SERPL-MCNC: 12.6 NG/ML (ref 30–100)
ALBUMIN SERPL BCP-MCNC: 4.3 G/DL (ref 3.5–5)
ALP SERPL-CCNC: 64 U/L (ref 46–116)
ALT SERPL W P-5'-P-CCNC: 24 U/L (ref 12–78)
ANION GAP SERPL CALCULATED.3IONS-SCNC: 9 MMOL/L (ref 4–13)
AST SERPL W P-5'-P-CCNC: 17 U/L (ref 5–45)
BASOPHILS # BLD AUTO: 0.04 THOUSANDS/ΜL (ref 0–0.1)
BASOPHILS NFR BLD AUTO: 1 % (ref 0–1)
BILIRUB SERPL-MCNC: 0.8 MG/DL (ref 0.2–1)
BUN SERPL-MCNC: 17 MG/DL (ref 5–25)
CALCIUM SERPL-MCNC: 9.3 MG/DL (ref 8.3–10.1)
CHLORIDE SERPL-SCNC: 106 MMOL/L (ref 100–108)
CHOLEST SERPL-MCNC: 222 MG/DL (ref 50–200)
CO2 SERPL-SCNC: 26 MMOL/L (ref 21–32)
CREAT SERPL-MCNC: 0.73 MG/DL (ref 0.6–1.3)
EOSINOPHIL # BLD AUTO: 0.22 THOUSAND/ΜL (ref 0–0.61)
EOSINOPHIL NFR BLD AUTO: 4 % (ref 0–6)
ERYTHROCYTE [DISTWIDTH] IN BLOOD BY AUTOMATED COUNT: 12 % (ref 11.6–15.1)
GFR SERPL CREATININE-BSD FRML MDRD: 102 ML/MIN/1.73SQ M
GLUCOSE P FAST SERPL-MCNC: 79 MG/DL (ref 65–99)
HCT VFR BLD AUTO: 43.2 % (ref 34.8–46.1)
HDLC SERPL-MCNC: 44 MG/DL (ref 40–60)
HGB BLD-MCNC: 14.8 G/DL (ref 11.5–15.4)
LDLC SERPL CALC-MCNC: 167 MG/DL (ref 0–100)
LYMPHOCYTES # BLD AUTO: 2.08 THOUSANDS/ΜL (ref 0.6–4.47)
LYMPHOCYTES NFR BLD AUTO: 33 % (ref 14–44)
MCH RBC QN AUTO: 29.8 PG (ref 26.8–34.3)
MCHC RBC AUTO-ENTMCNC: 34.3 G/DL (ref 31.4–37.4)
MCV RBC AUTO: 87 FL (ref 82–98)
MONOCYTES # BLD AUTO: 0.55 THOUSAND/ΜL (ref 0.17–1.22)
MONOCYTES NFR BLD AUTO: 9 % (ref 4–12)
NEUTROPHILS # BLD AUTO: 3.35 THOUSANDS/ΜL (ref 1.85–7.62)
NEUTS SEG NFR BLD AUTO: 53 % (ref 43–75)
NONHDLC SERPL-MCNC: 178 MG/DL
PLATELET # BLD AUTO: 310 THOUSANDS/UL (ref 149–390)
PMV BLD AUTO: 11.3 FL (ref 8.9–12.7)
POTASSIUM SERPL-SCNC: 4.5 MMOL/L (ref 3.5–5.3)
PROT SERPL-MCNC: 7.6 G/DL (ref 6.4–8.2)
RBC # BLD AUTO: 4.96 MILLION/UL (ref 3.81–5.12)
SODIUM SERPL-SCNC: 141 MMOL/L (ref 136–145)
TRIGL SERPL-MCNC: 56 MG/DL
WBC # BLD AUTO: 6.24 THOUSAND/UL (ref 4.31–10.16)

## 2018-04-21 PROCEDURE — 80061 LIPID PANEL: CPT

## 2018-04-21 PROCEDURE — 80053 COMPREHEN METABOLIC PANEL: CPT

## 2018-04-21 PROCEDURE — 82306 VITAMIN D 25 HYDROXY: CPT

## 2018-04-21 PROCEDURE — 36415 COLL VENOUS BLD VENIPUNCTURE: CPT

## 2018-04-21 PROCEDURE — 85025 COMPLETE CBC W/AUTO DIFF WBC: CPT

## 2018-04-26 RX ORDER — NORETHINDRONE 0.35 MG
KIT ORAL
Refills: 1 | COMMUNITY
Start: 2018-02-26 | End: 2018-04-30 | Stop reason: SDUPTHER

## 2018-04-26 RX ORDER — ACETAMINOPHEN AND CODEINE PHOSPHATE 120; 12 MG/5ML; MG/5ML
0.35 SOLUTION ORAL
COMMUNITY
Start: 2018-01-22 | End: 2019-05-18 | Stop reason: ALTCHOICE

## 2018-04-30 ENCOUNTER — OFFICE VISIT (OUTPATIENT)
Dept: INTERNAL MEDICINE CLINIC | Facility: CLINIC | Age: 43
End: 2018-04-30
Payer: COMMERCIAL

## 2018-04-30 VITALS
TEMPERATURE: 97.8 F | SYSTOLIC BLOOD PRESSURE: 120 MMHG | BODY MASS INDEX: 25.54 KG/M2 | OXYGEN SATURATION: 97 % | DIASTOLIC BLOOD PRESSURE: 68 MMHG | WEIGHT: 144.13 LBS | HEART RATE: 95 BPM | HEIGHT: 63 IN

## 2018-04-30 DIAGNOSIS — E55.9 VITAMIN D DEFICIENCY: ICD-10-CM

## 2018-04-30 DIAGNOSIS — E78.2 MIXED HYPERLIPIDEMIA: ICD-10-CM

## 2018-04-30 DIAGNOSIS — F41.9 ANXIETY: Primary | ICD-10-CM

## 2018-04-30 DIAGNOSIS — E78.5 BORDERLINE HYPERLIPIDEMIA: ICD-10-CM

## 2018-04-30 PROBLEM — N92.1 METRORRHAGIA: Status: ACTIVE | Noted: 2018-01-22

## 2018-04-30 PROCEDURE — 99396 PREV VISIT EST AGE 40-64: CPT | Performed by: INTERNAL MEDICINE

## 2018-04-30 RX ORDER — ERGOCALCIFEROL 1.25 MG/1
50000 CAPSULE ORAL WEEKLY
Qty: 4 CAPSULE | Refills: 2 | Status: SHIPPED | OUTPATIENT
Start: 2018-04-30 | End: 2018-11-02 | Stop reason: ALTCHOICE

## 2018-04-30 RX ORDER — DIAZEPAM 2 MG/1
2 TABLET ORAL EVERY 6 HOURS PRN
Qty: 5 TABLET | Refills: 0 | Status: SHIPPED | OUTPATIENT
Start: 2018-04-30 | End: 2019-06-05 | Stop reason: ALTCHOICE

## 2018-04-30 NOTE — PROGRESS NOTES
Assessment/Plan:    Vitamin d deficiency Start 08673 iu weekly for 12 weeks then 1000 iu daily  Patient will get some sun during the summer in small doses as well    Hyperlipidemia She plans to get back on track with regular exercise and diet  Recheck ldd in 4- 6 months to assure improvement    Sinusitis Patient has not had any recent sxs since changing her diet  She will use otc meds if needed    RTO annual visit  Rx for #5 valium 2 mg for upcoming flight to [facility] at pt request since it is s direct flight and she gets anxious on the plane           Patient ID: Princess Enriquez is a 43 y o  female  HPI   Pt has been generally well but recently in [facility] and has had stomach discomfort and n/v/d   The diarrhea has lessened and no vomiting today but shs did take an immodium  No fever or chills  She ate a salad at the airport coming home and thinks that started sxs  She feels a bit better today and ate a small amount of pastat earlier without sxs  She did not go to [facility] today  She has been exercising but not as regularly since gallbladder surgery last year  That surgery did reolve her other stomach sxs and prior to this bowels were regular  Her menses were erratic and she is now on ocp with some relief of heavy bleeding    The following portions of the patient's history were reviewed and updated as appropriate:   Past Medical History:   Diagnosis Date    Choledocholithiasis     Last assessed - 1/26/17    H/O seasonal allergies     Hyperlipidemia     Vaginal Pap smear     Vertigo      Past Surgical History:   Procedure Laterality Date    WA ESOPHAGOGASTRODUODENOSCOPY TRANSORAL DIAGNOSTIC N/A 1/6/2017    Procedure: ESOPHAGOGASTRODUODENOSCOPY (EGD);   Surgeon: Aniket Edgar MD;  [facility];  Service: Gastroenterology    WA LAP,CHOLECYSTECTOMY N/A 2/14/2017    Procedure: CHOLECYSTECTOMY LAPAROSCOPIC;  Surgeon: Alex Vriamontes DO;  [facility];  Service: General    ROTATOR CUFF REPAIR Right     ROTATOR CUFF REPAIR  2009     Social History     Social History    Marital status: /Civil Union     Spouse name: N/A    Number of children: N/A    Years of education: N/A     Occupational History     [address]     F/T     Social History Main Topics    Smoking status: Former Smoker     Types: Cigarettes    Smokeless tobacco: Never Used    Alcohol use Yes      Comment: occassionally    Drug use: No    Sexual activity: Yes     Other Topics Concern    Not on file     Social History Narrative    Uses safety equipment - Always uses seat belt    Caffeine use    Good dental hygiene - Dental care, regularly         Allergies   Allergen Reactions    Strawberry C [Ascorbate] Hives         Review of Systems   Constitutional: Negative  HENT: Negative  Eyes: Negative  Respiratory: Negative  Cardiovascular: Negative  Gastrointestinal: Negative  Endocrine: Negative  Genitourinary: Negative  Musculoskeletal: Negative  Skin: Negative  Allergic/Immunologic: Negative  Neurological: Negative  Hematological: Negative  Psychiatric/Behavioral: Negative  /68   Pulse 95   Temp 97 8 °F (36 6 °C) (Tympanic)   Ht 5' 3" (1 6 m)   Wt 65 4 kg (144 lb 2 oz)   SpO2 97%   BMI 25 53 kg/m²      Physical Exam   Constitutional: She is oriented to person, place, and time  She appears well-developed and well-nourished  No distress  HENT:   Head: Normocephalic and atraumatic  Right Ear: External ear normal    Left Ear: External ear normal    Nose: Nose normal    Mouth/Throat: Oropharynx is clear and moist  No oropharyngeal exudate  Eyes: Conjunctivae and EOM are normal  Pupils are equal, round, and reactive to light  No scleral icterus  Neck: Normal range of motion  Neck supple  Cardiovascular: Normal rate, regular rhythm, normal heart sounds and intact distal pulses  Pulmonary/Chest: Effort normal and breath sounds normal  No respiratory distress   She has no wheezes  She has no rales  She exhibits no tenderness  Abdominal: Soft  Bowel sounds are normal  She exhibits no distension and no mass  There is no tenderness  There is no rebound and no guarding  Musculoskeletal: Normal range of motion  She exhibits no edema, tenderness or deformity  Lymphadenopathy:     She has no cervical adenopathy  Neurological: She is alert and oriented to person, place, and time  She has normal reflexes  She displays normal reflexes  No cranial nerve deficit  She exhibits normal muscle tone  Coordination normal    Skin: Skin is warm and dry  She is not diaphoretic  Psychiatric: She has a normal mood and affect  Her behavior is normal  Judgment and thought content normal    Nursing note and vitals reviewed

## 2018-06-12 ENCOUNTER — TELEPHONE (OUTPATIENT)
Dept: INTERNAL MEDICINE CLINIC | Facility: CLINIC | Age: 43
End: 2018-06-12

## 2018-11-02 ENCOUNTER — OFFICE VISIT (OUTPATIENT)
Dept: INTERNAL MEDICINE CLINIC | Facility: CLINIC | Age: 43
End: 2018-11-02
Payer: COMMERCIAL

## 2018-11-02 VITALS
SYSTOLIC BLOOD PRESSURE: 124 MMHG | HEIGHT: 63 IN | BODY MASS INDEX: 26.22 KG/M2 | DIASTOLIC BLOOD PRESSURE: 78 MMHG | TEMPERATURE: 96.7 F | WEIGHT: 148 LBS | HEART RATE: 81 BPM | OXYGEN SATURATION: 97 %

## 2018-11-02 DIAGNOSIS — G62.9 NEUROPATHY: ICD-10-CM

## 2018-11-02 DIAGNOSIS — K21.9 GASTROESOPHAGEAL REFLUX DISEASE, ESOPHAGITIS PRESENCE NOT SPECIFIED: ICD-10-CM

## 2018-11-02 DIAGNOSIS — E78.2 MIXED HYPERLIPIDEMIA: ICD-10-CM

## 2018-11-02 DIAGNOSIS — L30.9 ECZEMA, UNSPECIFIED TYPE: Primary | ICD-10-CM

## 2018-11-02 PROCEDURE — 99214 OFFICE O/P EST MOD 30 MIN: CPT | Performed by: INTERNAL MEDICINE

## 2018-11-02 PROCEDURE — 3008F BODY MASS INDEX DOCD: CPT | Performed by: INTERNAL MEDICINE

## 2018-11-02 PROCEDURE — 1036F TOBACCO NON-USER: CPT | Performed by: INTERNAL MEDICINE

## 2018-11-02 RX ORDER — TACROLIMUS 0.3 MG/G
OINTMENT TOPICAL 2 TIMES DAILY
Qty: 100 G | Refills: 0 | Status: SHIPPED | OUTPATIENT
Start: 2018-11-02 | End: 2019-05-18 | Stop reason: ALTCHOICE

## 2018-11-02 NOTE — PROGRESS NOTES
Assessment/Plan:        Eczema -  Use protopic as needed and continue moisturizer daily to dry skin  Check TSH with labs - patient going today  Soonest derm appt she could get was january    Hyperlipidemia- due for recheck LDL  She will increase exercise consistency and continues to work with diet changes    GERD - sxs seem more consistent with GERD and advised 2 week otc PPI daily  Gerd diet and exercise  If sxs do not improve would refer to ENT (pt aware) to due visualization of sinus/larynx to localize cause of sxs      Neuropathy - sxs seem related to treadmill so will use gel inserts, follow up with podiatry and if sxs persist, EMG recommended    Going for labs today   Routine appt in spring /prn sxs persist     Patient ID: Felice Moran is a 37 y o  female  HPI   Pt has had dry skin patches behind ear as well as on right cheek area  No blisters or rash  She uses daily cetaphil and neutrogena moisturizer  She has had a lump feeling when swallowing - has some pnd and feels these sxs more related to acid reflux  She has not taken an Rx as of yet for the acid reflux sxs  No chest pain and no consistent cough  No sob  She has sensation b/l feet when she exercises on treadmill - hx of low back injury years ago and sxs seem only to happen with exercise on the treadmill  No recent trauma  No urinary sxs  No acute asthma sxs or breathing issues and allergy sxs seem stable overall although she has nasal congestion at times [place] since the heat went on/        Review of Systems   Constitutional: Negative  HENT: Negative  Eyes: Negative  Respiratory: Negative  Cardiovascular: Negative  Gastrointestinal: Negative  Endocrine: Negative  Genitourinary: Negative  Musculoskeletal: Negative  Skin:        Dry skin behind each ear   Allergic/Immunologic: Negative  Neurological: Negative  Hematological: Negative  Psychiatric/Behavioral: Negative        Past Medical History:   Diagnosis Date    Choledocholithiasis     Last assessed - 1/26/17    H/O seasonal allergies     Hyperlipidemia     Vaginal Pap smear     Vertigo      Past Surgical History:   Procedure Laterality Date    AL ESOPHAGOGASTRODUODENOSCOPY TRANSORAL DIAGNOSTIC N/A 1/6/2017    Procedure: ESOPHAGOGASTRODUODENOSCOPY (EGD); Surgeon: Gonzalo Turner MD;  [facility];  Service: Gastroenterology    AL LAP,CHOLECYSTECTOMY N/A 2/14/2017    Procedure: CHOLECYSTECTOMY LAPAROSCOPIC;  Surgeon: Ramsey Patel DO;  [facility];  Service: General    ROTATOR CUFF REPAIR Right     ROTATOR CUFF REPAIR  2009     Social History     Social History    Marital status: /Civil Union     Spouse name: N/A    Number of children: N/A    Years of education: N/A     Occupational History   [employer] [employer]     F/T     Social History Main Topics    Smoking status: Former Smoker     Types: Cigarettes    Smokeless tobacco: Never Used    Alcohol use Yes      Comment: occassionally    Drug use: No    Sexual activity: Yes     Other Topics Concern    Not on file     Social History Narrative    Uses safety equipment - Always uses seat belt    Caffeine use    Good dental hygiene - Dental care, regularly         Allergies   Allergen Reactions    Strawberry C [Ascorbate] Hives       /78   Pulse 81   Temp (!) 96 7 °F (35 9 °C) (Tympanic)   Ht 5' 3" (1 6 m)   Wt 67 1 kg (148 lb)   SpO2 97%   BMI 26 22 kg/m²      Physical Exam   Constitutional: She is oriented to person, place, and time  She appears well-developed and well-nourished  No distress  HENT:   Head: Normocephalic and atraumatic  Right Ear: External ear normal    Left Ear: External ear normal    Nose: Nose normal    Mouth/Throat: Oropharynx is clear and moist  No oropharyngeal exudate  Eyes: Pupils are equal, round, and reactive to light  Conjunctivae and EOM are normal  No scleral icterus  Neck: Normal range of motion  Neck supple  No JVD present  Cardiovascular: Normal rate, regular rhythm, normal heart sounds and intact distal pulses  No murmur heard  Pulmonary/Chest: Effort normal and breath sounds normal  No respiratory distress  She has no wheezes  She has no rales  Abdominal: Soft  Bowel sounds are normal  She exhibits no distension  There is no tenderness  There is no rebound  Musculoskeletal: Normal range of motion  She exhibits no edema, tenderness or deformity  Lymphadenopathy:     She has no cervical adenopathy  Neurological: She is alert and oriented to person, place, and time  She has normal reflexes  She displays normal reflexes  No cranial nerve deficit  She exhibits normal muscle tone  Coordination normal    Skin: Skin is warm and dry  She is not diaphoretic  Psychiatric: She has a normal mood and affect  Her behavior is normal  Judgment and thought content normal    Nursing note and vitals reviewed

## 2018-11-25 ENCOUNTER — APPOINTMENT (OUTPATIENT)
Dept: LAB | Facility: HOSPITAL | Age: 43
End: 2018-11-25
Attending: INTERNAL MEDICINE
Payer: COMMERCIAL

## 2018-11-25 ENCOUNTER — TRANSCRIBE ORDERS (OUTPATIENT)
Dept: ADMINISTRATIVE | Facility: HOSPITAL | Age: 43
End: 2018-11-25

## 2018-11-25 DIAGNOSIS — L30.9 ECZEMA, UNSPECIFIED TYPE: Primary | ICD-10-CM

## 2018-11-25 DIAGNOSIS — G62.9 NEUROPATHY: ICD-10-CM

## 2018-11-25 DIAGNOSIS — E78.2 MIXED HYPERLIPIDEMIA: ICD-10-CM

## 2018-11-25 DIAGNOSIS — L30.9 ECZEMA, UNSPECIFIED TYPE: ICD-10-CM

## 2018-11-25 LAB
EST. AVERAGE GLUCOSE BLD GHB EST-MCNC: 94 MG/DL
HBA1C MFR BLD: 4.9 % (ref 4.2–6.3)
LDLC SERPL DIRECT ASSAY-MCNC: 187 MG/DL (ref 0–100)
TSH SERPL DL<=0.05 MIU/L-ACNC: 3.31 UIU/ML (ref 0.36–3.74)

## 2018-11-25 PROCEDURE — 36415 COLL VENOUS BLD VENIPUNCTURE: CPT

## 2018-11-25 PROCEDURE — 84443 ASSAY THYROID STIM HORMONE: CPT

## 2018-11-25 PROCEDURE — 83036 HEMOGLOBIN GLYCOSYLATED A1C: CPT

## 2018-11-25 PROCEDURE — 83721 ASSAY OF BLOOD LIPOPROTEIN: CPT

## 2018-11-27 ENCOUNTER — TRANSCRIBE ORDERS (OUTPATIENT)
Dept: ADMINISTRATIVE | Facility: HOSPITAL | Age: 43
End: 2018-11-27

## 2018-11-27 DIAGNOSIS — Z12.39 BREAST SCREENING: Primary | ICD-10-CM

## 2018-11-27 DIAGNOSIS — R92.2 BREAST DENSITY: ICD-10-CM

## 2018-12-13 ENCOUNTER — HOSPITAL ENCOUNTER (OUTPATIENT)
Dept: MAMMOGRAPHY | Facility: HOSPITAL | Age: 43
Discharge: HOME | End: 2018-12-13
Payer: COMMERCIAL

## 2018-12-13 VITALS — BODY MASS INDEX: 26.22 KG/M2 | WEIGHT: 148 LBS | HEIGHT: 63 IN

## 2018-12-13 DIAGNOSIS — Z12.39 BREAST SCREENING: ICD-10-CM

## 2018-12-13 DIAGNOSIS — R92.2 BREAST DENSITY: ICD-10-CM

## 2018-12-13 PROCEDURE — 77063 BREAST TOMOSYNTHESIS BI: CPT

## 2018-12-13 PROCEDURE — 77067 SCR MAMMO BI INCL CAD: CPT

## 2019-03-05 ENCOUNTER — TELEPHONE (OUTPATIENT)
Dept: INTERNAL MEDICINE CLINIC | Facility: CLINIC | Age: 44
End: 2019-03-05

## 2019-03-05 DIAGNOSIS — E78.2 MIXED HYPERLIPIDEMIA: ICD-10-CM

## 2019-03-05 DIAGNOSIS — E78.5 BORDERLINE HYPERLIPIDEMIA: Primary | ICD-10-CM

## 2019-03-13 ENCOUNTER — TRANSCRIBE ORDERS (OUTPATIENT)
Dept: ADMINISTRATIVE | Facility: HOSPITAL | Age: 44
End: 2019-03-13

## 2019-03-13 ENCOUNTER — APPOINTMENT (OUTPATIENT)
Dept: LAB | Facility: HOSPITAL | Age: 44
End: 2019-03-13
Payer: COMMERCIAL

## 2019-03-13 DIAGNOSIS — N92.1 METRORRHAGIA: ICD-10-CM

## 2019-03-13 DIAGNOSIS — Z01.818 PREOP EXAMINATION: ICD-10-CM

## 2019-03-13 DIAGNOSIS — N92.1 METRORRHAGIA: Primary | ICD-10-CM

## 2019-03-13 LAB
ANION GAP SERPL CALCULATED.3IONS-SCNC: 10 MMOL/L (ref 4–13)
B-HCG SERPL-ACNC: <2 MIU/ML
BUN SERPL-MCNC: 16 MG/DL (ref 5–25)
CALCIUM SERPL-MCNC: 8.8 MG/DL (ref 8.3–10.1)
CHLORIDE SERPL-SCNC: 102 MMOL/L (ref 100–108)
CO2 SERPL-SCNC: 26 MMOL/L (ref 21–32)
CREAT SERPL-MCNC: 0.74 MG/DL (ref 0.6–1.3)
ERYTHROCYTE [DISTWIDTH] IN BLOOD BY AUTOMATED COUNT: 11.9 % (ref 11.6–15.1)
GFR SERPL CREATININE-BSD FRML MDRD: 100 ML/MIN/1.73SQ M
GLUCOSE P FAST SERPL-MCNC: 91 MG/DL (ref 65–99)
HCT VFR BLD AUTO: 43.1 % (ref 34.8–46.1)
HGB BLD-MCNC: 14.1 G/DL (ref 11.5–15.4)
MCH RBC QN AUTO: 28.9 PG (ref 26.8–34.3)
MCHC RBC AUTO-ENTMCNC: 32.7 G/DL (ref 31.4–37.4)
MCV RBC AUTO: 88 FL (ref 82–98)
PLATELET # BLD AUTO: 312 THOUSANDS/UL (ref 149–390)
PMV BLD AUTO: 9.9 FL (ref 8.9–12.7)
POTASSIUM SERPL-SCNC: 4.3 MMOL/L (ref 3.5–5.3)
RBC # BLD AUTO: 4.88 MILLION/UL (ref 3.81–5.12)
SODIUM SERPL-SCNC: 138 MMOL/L (ref 136–145)
WBC # BLD AUTO: 8.16 THOUSAND/UL (ref 4.31–10.16)

## 2019-03-13 PROCEDURE — 36415 COLL VENOUS BLD VENIPUNCTURE: CPT

## 2019-03-13 PROCEDURE — 80048 BASIC METABOLIC PNL TOTAL CA: CPT

## 2019-03-13 PROCEDURE — 85027 COMPLETE CBC AUTOMATED: CPT

## 2019-03-13 PROCEDURE — 84702 CHORIONIC GONADOTROPIN TEST: CPT

## 2019-05-12 ENCOUNTER — APPOINTMENT (OUTPATIENT)
Dept: LAB | Facility: HOSPITAL | Age: 44
End: 2019-05-12
Attending: INTERNAL MEDICINE
Payer: COMMERCIAL

## 2019-05-12 DIAGNOSIS — E78.2 MIXED HYPERLIPIDEMIA: ICD-10-CM

## 2019-05-12 LAB
ALBUMIN SERPL BCP-MCNC: 3.9 G/DL (ref 3.5–5)
ALP SERPL-CCNC: 63 U/L (ref 46–116)
ALT SERPL W P-5'-P-CCNC: 12 U/L (ref 12–78)
ANION GAP SERPL CALCULATED.3IONS-SCNC: 8 MMOL/L (ref 4–13)
AST SERPL W P-5'-P-CCNC: 12 U/L (ref 5–45)
BASOPHILS # BLD AUTO: 0.05 THOUSANDS/ΜL (ref 0–0.1)
BASOPHILS NFR BLD AUTO: 1 % (ref 0–1)
BILIRUB SERPL-MCNC: 1 MG/DL (ref 0.2–1)
BUN SERPL-MCNC: 14 MG/DL (ref 5–25)
CALCIUM SERPL-MCNC: 8.7 MG/DL (ref 8.3–10.1)
CHLORIDE SERPL-SCNC: 105 MMOL/L (ref 100–108)
CHOLEST SERPL-MCNC: 208 MG/DL (ref 50–200)
CO2 SERPL-SCNC: 29 MMOL/L (ref 21–32)
CREAT SERPL-MCNC: 0.84 MG/DL (ref 0.6–1.3)
EOSINOPHIL # BLD AUTO: 0.23 THOUSAND/ΜL (ref 0–0.61)
EOSINOPHIL NFR BLD AUTO: 3 % (ref 0–6)
ERYTHROCYTE [DISTWIDTH] IN BLOOD BY AUTOMATED COUNT: 11.9 % (ref 11.6–15.1)
GFR SERPL CREATININE-BSD FRML MDRD: 85 ML/MIN/1.73SQ M
GLUCOSE P FAST SERPL-MCNC: 81 MG/DL (ref 65–99)
HCT VFR BLD AUTO: 42.5 % (ref 34.8–46.1)
HDLC SERPL-MCNC: 43 MG/DL (ref 40–60)
HGB BLD-MCNC: 13.6 G/DL (ref 11.5–15.4)
IMM GRANULOCYTES # BLD AUTO: 0.01 THOUSAND/UL (ref 0–0.2)
IMM GRANULOCYTES NFR BLD AUTO: 0 % (ref 0–2)
LDLC SERPL CALC-MCNC: 147 MG/DL (ref 0–100)
LYMPHOCYTES # BLD AUTO: 2.3 THOUSANDS/ΜL (ref 0.6–4.47)
LYMPHOCYTES NFR BLD AUTO: 35 % (ref 14–44)
MCH RBC QN AUTO: 29.1 PG (ref 26.8–34.3)
MCHC RBC AUTO-ENTMCNC: 32 G/DL (ref 31.4–37.4)
MCV RBC AUTO: 91 FL (ref 82–98)
MONOCYTES # BLD AUTO: 0.5 THOUSAND/ΜL (ref 0.17–1.22)
MONOCYTES NFR BLD AUTO: 8 % (ref 4–12)
NEUTROPHILS # BLD AUTO: 3.58 THOUSANDS/ΜL (ref 1.85–7.62)
NEUTS SEG NFR BLD AUTO: 53 % (ref 43–75)
NONHDLC SERPL-MCNC: 165 MG/DL
NRBC BLD AUTO-RTO: 0 /100 WBCS
PLATELET # BLD AUTO: 284 THOUSANDS/UL (ref 149–390)
PMV BLD AUTO: 10.4 FL (ref 8.9–12.7)
POTASSIUM SERPL-SCNC: 4.4 MMOL/L (ref 3.5–5.3)
PROT SERPL-MCNC: 7.3 G/DL (ref 6.4–8.2)
RBC # BLD AUTO: 4.68 MILLION/UL (ref 3.81–5.12)
SODIUM SERPL-SCNC: 142 MMOL/L (ref 136–145)
TRIGL SERPL-MCNC: 92 MG/DL
WBC # BLD AUTO: 6.67 THOUSAND/UL (ref 4.31–10.16)

## 2019-05-12 PROCEDURE — 80061 LIPID PANEL: CPT

## 2019-05-12 PROCEDURE — 80053 COMPREHEN METABOLIC PANEL: CPT

## 2019-05-12 PROCEDURE — 36415 COLL VENOUS BLD VENIPUNCTURE: CPT

## 2019-05-12 PROCEDURE — 85025 COMPLETE CBC W/AUTO DIFF WBC: CPT

## 2019-05-18 ENCOUNTER — OFFICE VISIT (OUTPATIENT)
Dept: INTERNAL MEDICINE CLINIC | Facility: CLINIC | Age: 44
End: 2019-05-18
Payer: COMMERCIAL

## 2019-05-18 VITALS
BODY MASS INDEX: 26.4 KG/M2 | HEIGHT: 63 IN | HEART RATE: 72 BPM | DIASTOLIC BLOOD PRESSURE: 70 MMHG | TEMPERATURE: 98.9 F | OXYGEN SATURATION: 98 % | WEIGHT: 149 LBS | SYSTOLIC BLOOD PRESSURE: 122 MMHG

## 2019-05-18 DIAGNOSIS — L30.9 ECZEMA, UNSPECIFIED TYPE: ICD-10-CM

## 2019-05-18 DIAGNOSIS — E78.2 MIXED HYPERLIPIDEMIA: ICD-10-CM

## 2019-05-18 DIAGNOSIS — Z00.00 VISIT FOR PREVENTIVE HEALTH EXAMINATION: Primary | ICD-10-CM

## 2019-05-18 DIAGNOSIS — J45.20 MILD INTERMITTENT ASTHMA WITHOUT COMPLICATION: ICD-10-CM

## 2019-05-18 PROBLEM — K80.50 RECURRENT BILIARY COLIC: Status: RESOLVED | Noted: 2017-02-14 | Resolved: 2019-05-18

## 2019-05-18 PROBLEM — K21.9 GASTROESOPHAGEAL REFLUX DISEASE: Status: RESOLVED | Noted: 2018-11-02 | Resolved: 2019-05-18

## 2019-05-18 PROBLEM — G62.9 NEUROPATHY: Status: RESOLVED | Noted: 2018-11-02 | Resolved: 2019-05-18

## 2019-05-18 PROBLEM — N92.1 METRORRHAGIA: Status: RESOLVED | Noted: 2018-01-22 | Resolved: 2019-05-18

## 2019-05-18 PROCEDURE — 99396 PREV VISIT EST AGE 40-64: CPT | Performed by: INTERNAL MEDICINE

## 2019-06-05 ENCOUNTER — OFFICE VISIT (OUTPATIENT)
Dept: INTERNAL MEDICINE CLINIC | Facility: CLINIC | Age: 44
End: 2019-06-05
Payer: COMMERCIAL

## 2019-06-05 VITALS
OXYGEN SATURATION: 98 % | HEART RATE: 82 BPM | HEIGHT: 63 IN | BODY MASS INDEX: 27.49 KG/M2 | TEMPERATURE: 98.2 F | WEIGHT: 155.13 LBS

## 2019-06-05 DIAGNOSIS — J45.20 MILD INTERMITTENT ASTHMA WITHOUT COMPLICATION: ICD-10-CM

## 2019-06-05 DIAGNOSIS — J01.90 ACUTE SINUSITIS, RECURRENCE NOT SPECIFIED, UNSPECIFIED LOCATION: Primary | ICD-10-CM

## 2019-06-05 PROCEDURE — 1036F TOBACCO NON-USER: CPT | Performed by: INTERNAL MEDICINE

## 2019-06-05 PROCEDURE — 3008F BODY MASS INDEX DOCD: CPT | Performed by: INTERNAL MEDICINE

## 2019-06-05 PROCEDURE — 99213 OFFICE O/P EST LOW 20 MIN: CPT | Performed by: INTERNAL MEDICINE

## 2019-06-05 RX ORDER — AMOXICILLIN AND CLAVULANATE POTASSIUM 875; 125 MG/1; MG/1
1 TABLET, FILM COATED ORAL 2 TIMES DAILY
Qty: 14 TABLET | Refills: 0 | Status: SHIPPED | OUTPATIENT
Start: 2019-06-05 | End: 2019-06-12

## 2019-06-05 RX ORDER — DEXTROMETHORPHAN HYDROBROMIDE AND PROMETHAZINE HYDROCHLORIDE 15; 6.25 MG/5ML; MG/5ML
5 SOLUTION ORAL 4 TIMES DAILY PRN
Qty: 180 ML | Refills: 0 | Status: SHIPPED | OUTPATIENT
Start: 2019-06-05 | End: 2020-06-25 | Stop reason: ALTCHOICE

## 2019-12-03 ENCOUNTER — TRANSCRIBE ORDERS (OUTPATIENT)
Dept: ADMINISTRATIVE | Facility: HOSPITAL | Age: 44
End: 2019-12-03

## 2019-12-03 DIAGNOSIS — Z12.31 SCREENING MAMMOGRAM FOR HIGH-RISK PATIENT: Primary | ICD-10-CM

## 2020-01-02 ENCOUNTER — HOSPITAL ENCOUNTER (OUTPATIENT)
Dept: MAMMOGRAPHY | Facility: HOSPITAL | Age: 45
Discharge: HOME | End: 2020-01-02
Payer: COMMERCIAL

## 2020-01-02 VITALS — WEIGHT: 155 LBS | HEIGHT: 63 IN | BODY MASS INDEX: 27.46 KG/M2

## 2020-01-02 DIAGNOSIS — Z12.31 SCREENING MAMMOGRAM FOR HIGH-RISK PATIENT: ICD-10-CM

## 2020-01-02 PROCEDURE — 77063 BREAST TOMOSYNTHESIS BI: CPT

## 2020-01-02 PROCEDURE — 77067 SCR MAMMO BI INCL CAD: CPT

## 2020-01-07 ENCOUNTER — HOSPITAL ENCOUNTER (OUTPATIENT)
Dept: MAMMOGRAPHY | Facility: CLINIC | Age: 45
Discharge: HOME | End: 2020-01-07
Payer: COMMERCIAL

## 2020-01-07 ENCOUNTER — HOSPITAL ENCOUNTER (OUTPATIENT)
Dept: ULTRASOUND IMAGING | Facility: CLINIC | Age: 45
Discharge: HOME | End: 2020-01-07
Payer: COMMERCIAL

## 2020-01-07 VITALS — BODY MASS INDEX: 27.46 KG/M2 | HEIGHT: 63 IN | WEIGHT: 155 LBS

## 2020-01-07 DIAGNOSIS — R92.8 ABNORMAL MAMMOGRAM: ICD-10-CM

## 2020-01-07 PROCEDURE — 77065 DX MAMMO INCL CAD UNI: CPT

## 2020-01-07 PROCEDURE — 76642 ULTRASOUND BREAST LIMITED: CPT

## 2020-01-07 PROCEDURE — G0279 TOMOSYNTHESIS, MAMMO: HCPCS

## 2020-06-25 ENCOUNTER — TELEMEDICINE (OUTPATIENT)
Dept: INTERNAL MEDICINE CLINIC | Facility: CLINIC | Age: 45
End: 2020-06-25
Payer: COMMERCIAL

## 2020-06-25 DIAGNOSIS — Z00.00 ANNUAL PHYSICAL EXAM: Primary | ICD-10-CM

## 2020-06-25 DIAGNOSIS — J30.1 ALLERGIC RHINITIS DUE TO POLLEN, UNSPECIFIED SEASONALITY: ICD-10-CM

## 2020-06-25 PROBLEM — J01.90 ACUTE SINUSITIS: Status: RESOLVED | Noted: 2019-06-05 | Resolved: 2020-06-25

## 2020-06-25 PROCEDURE — 99396 PREV VISIT EST AGE 40-64: CPT | Performed by: INTERNAL MEDICINE

## 2020-06-25 RX ORDER — AZELASTINE 1 MG/ML
1 SPRAY, METERED NASAL 2 TIMES DAILY
Qty: 1 BOTTLE | Refills: 3 | Status: SHIPPED | OUTPATIENT
Start: 2020-06-25

## 2021-01-14 ENCOUNTER — TELEPHONE (OUTPATIENT)
Dept: ADMINISTRATIVE | Facility: OTHER | Age: 46
End: 2021-01-14

## 2021-01-14 NOTE — TELEPHONE ENCOUNTER
Upon review of the In Basket request we were able to locate, review, and update the patient chart as requested for Pap Smear (HPV) aka Cervical Cancer Screening  Any additional questions or concerns should be emailed to the Practice Liaisons via [email or web address] email, please do not reply via In Basket      Thank you  Jesus Burrows

## 2021-01-14 NOTE — TELEPHONE ENCOUNTER
Upon review of the In Basket request we were able to locate, review, and update the patient chart as requested for Mammogram     Any additional questions or concerns should be emailed to the Practice Liaisons via [email or web address] email, please do not reply via In Basket      Thank you  Esmond Castleman

## 2021-01-14 NOTE — TELEPHONE ENCOUNTER
----- Message from Daniel Fofana MA sent at 1/14/2021  7:08 AM EST -----  Regarding: pap,mammo-slim [place]  01/14/21 7:08 AM    Hello, our patient Ezequiel Mathews has had Mammogram and Pap Smear (HPV) aka Cervical Cancer Screening completed/performed  Please assist in updating the patient chart by pulling the Care Everywhere (CE) document  The date of service is mammo 1/07/2020, pap 12/30/2020       Thank you,  Daniel Fofana MA  [facility]

## 2021-04-08 DIAGNOSIS — Z23 ENCOUNTER FOR IMMUNIZATION: ICD-10-CM

## 2021-11-23 ENCOUNTER — IMMUNIZATIONS (OUTPATIENT)
Dept: FAMILY MEDICINE CLINIC | Facility: HOSPITAL | Age: 46
End: 2021-11-23

## 2021-11-23 DIAGNOSIS — Z23 ENCOUNTER FOR IMMUNIZATION: Primary | ICD-10-CM

## 2021-11-23 PROCEDURE — 0001A COVID-19 PFIZER VACC 0.3 ML: CPT

## 2021-11-23 PROCEDURE — 91300 COVID-19 PFIZER VACC 0.3 ML: CPT

## 2022-03-15 ENCOUNTER — TELEPHONE (OUTPATIENT)
Dept: ADMINISTRATIVE | Facility: OTHER | Age: 47
End: 2022-03-15

## 2022-03-15 NOTE — TELEPHONE ENCOUNTER
----- Message from Shereen Buckner sent at 3/14/2022  5:19 PM EDT -----  Regarding: mammo  03/14/22 5:19 PM    Hello, our patient Caro Neil has had Mammogram completed/performed  Please assist in updating the patient chart by pulling the Care Everywhere (CE) document  The date of service is 2/3/22       Thank you,  Shereen [facility] [address]

## 2022-03-15 NOTE — TELEPHONE ENCOUNTER
Upon review of the In Basket request we were able to locate, review, and update the patient chart as requested for Mammogram     Any additional questions or concerns should be emailed to the Practice Liaisons via [email or web address] email, please do not reply via In Basket      Thank you  Michela Hoyos

## (undated) DEVICE — INTENDED FOR TISSUE SEPARATION, AND OTHER PROCEDURES THAT REQUIRE A SHARP SURGICAL BLADE TO PUNCTURE OR CUT.: Brand: BARD-PARKER SAFETY BLADES SIZE 15, STERILE

## (undated) DEVICE — CONMED SCOPE SAVER BITE BLOCK, 20X27 MM: Brand: SCOPE SAVER

## (undated) DEVICE — ENDOPATH 5 MM GRASPERS WITH RATCHET HANDLES: Brand: ENDOPATH

## (undated) DEVICE — ENDOPATH XCEL UNIVERSAL TROCAR STABLILITY SLEEVES: Brand: ENDOPATH XCEL

## (undated) DEVICE — CAUTERY TIP POLISHER: Brand: DEVON

## (undated) DEVICE — SUT VICRYL 0 UR-6 27 IN J603H

## (undated) DEVICE — LAP AEM SCISSOR TIP .5 IN

## (undated) DEVICE — REM POLYHESIVE ADULT PATIENT RETURN ELECTRODE: Brand: VALLEYLAB

## (undated) DEVICE — ENDOPATH XCEL BLADELESS TROCARS WITH STABILITY SLEEVES: Brand: ENDOPATH XCEL

## (undated) DEVICE — 3M™ STERI-STRIP™ REINFORCED ADHESIVE SKIN CLOSURES, R1546, 1/4 IN X 4 IN (6 MM X 100 MM), 10 STRIPS/ENVELOPE: Brand: 3M™ STERI-STRIP™

## (undated) DEVICE — ALL PURPOSE SPONGES,NON-WOVEN, 3 PLY: Brand: CURITY

## (undated) DEVICE — LIGAMAX 5 MM ENDOSCOPIC MULTIPLE CLIP APPLIER: Brand: LIGAMAX

## (undated) DEVICE — BUTTON SWITCH PENCIL HOLSTER: Brand: VALLEYLAB

## (undated) DEVICE — [HIGH FLOW INSUFFLATOR,  DO NOT USE IF PACKAGE IS DAMAGED,  KEEP DRY,  KEEP AWAY FROM SUNLIGHT,  PROTECT FROM HEAT AND RADIOACTIVE SOURCES.]: Brand: PNEUMOSURE

## (undated) DEVICE — ANTI-FOG SOLUTION WITH FOAM PAD: Brand: DEVON

## (undated) DEVICE — GAUZE SPONGES,8 PLY: Brand: CURITY

## (undated) DEVICE — PDS II VLT 0 107CM AG ST3: Brand: ENDOLOOP

## (undated) DEVICE — LUBRICANT SURGILUBE TUBE 4 OZ  FLIP TOP

## (undated) DEVICE — 1820 FOAM BLOCK NEEDLE COUNTER: Brand: DEVON

## (undated) DEVICE — TUBING SUCTION 5MM X 12 FT

## (undated) DEVICE — SUT MONOCRYL 4-0 PS-2 27 IN Y426H

## (undated) DEVICE — LIGHT GLOVE GREEN

## (undated) DEVICE — 2000CC GUARDIAN II: Brand: GUARDIAN

## (undated) DEVICE — NEEDLE 25G X 1 1/2

## (undated) DEVICE — IRRIG ENDO FLO TUBING

## (undated) DEVICE — GLOVE INDICATOR PI UNDERGLOVE SZ 7 BLUE

## (undated) DEVICE — 3M™ TEGADERM™ TRANSPARENT FILM DRESSING FRAME STYLE, 1624W, 2-3/8 IN X 2-3/4 IN (6 CM X 7 CM), 100/CT 4CT/CASE: Brand: 3M™ TEGADERM™

## (undated) DEVICE — FORCEPS BIOPSY ALLIGATOR JAW W/NEEDLE 2.8MM

## (undated) DEVICE — AEM CORD

## (undated) DEVICE — UTILITY MARKER,BLACK WITH LABELS: Brand: DEVON

## (undated) DEVICE — SYRINGE 10ML LL

## (undated) DEVICE — GENERAL ENDOSCOPY PACK: Brand: CONVERTORS

## (undated) DEVICE — GLOVE SRG BIOGEL ECLIPSE 7

## (undated) DEVICE — SCD SEQUENTIAL COMPRESSION COMFORT SLEEVE MEDIUM KNEE LENGTH: Brand: KENDALL SCD

## (undated) DEVICE — 3000CC GUARDIAN II: Brand: GUARDIAN

## (undated) DEVICE — SINGLE-USE POLYPECTOMY SNARE: Brand: SENSATION SHORT THROW

## (undated) DEVICE — ENDOPOUCH RETRIEVER SPECIMEN RETRIEVAL BAGS: Brand: ENDOPOUCH RETRIEVER

## (undated) DEVICE — TRANSPOSAL ULTRAFLEX DUO/QUAD ULTRA CART MANIFOLD

## (undated) DEVICE — SPONGE LAP 18 X 18 IN

## (undated) DEVICE — CHLORAPREP HI-LITE 26ML ORANGE